# Patient Record
Sex: MALE | Race: OTHER | URBAN - METROPOLITAN AREA
[De-identification: names, ages, dates, MRNs, and addresses within clinical notes are randomized per-mention and may not be internally consistent; named-entity substitution may affect disease eponyms.]

---

## 2018-06-02 ENCOUNTER — EMERGENCY (EMERGENCY)
Facility: HOSPITAL | Age: 33
LOS: 0 days | Discharge: HOME | End: 2018-06-02
Attending: EMERGENCY MEDICINE | Admitting: EMERGENCY MEDICINE
Payer: COMMERCIAL

## 2018-06-02 VITALS
HEIGHT: 67 IN | OXYGEN SATURATION: 98 % | WEIGHT: 190.04 LBS | HEART RATE: 87 BPM | TEMPERATURE: 98 F | SYSTOLIC BLOOD PRESSURE: 129 MMHG | DIASTOLIC BLOOD PRESSURE: 73 MMHG | RESPIRATION RATE: 19 BRPM

## 2018-06-02 VITALS
OXYGEN SATURATION: 100 % | RESPIRATION RATE: 18 BRPM | TEMPERATURE: 98 F | DIASTOLIC BLOOD PRESSURE: 92 MMHG | SYSTOLIC BLOOD PRESSURE: 119 MMHG | HEART RATE: 80 BPM

## 2018-06-02 DIAGNOSIS — R00.2 PALPITATIONS: ICD-10-CM

## 2018-06-02 LAB
ALBUMIN SERPL ELPH-MCNC: 4.1 G/DL — SIGNIFICANT CHANGE UP (ref 3.3–5)
ALP SERPL-CCNC: 69 U/L — SIGNIFICANT CHANGE UP (ref 40–120)
ALT FLD-CCNC: 42 U/L — SIGNIFICANT CHANGE UP (ref 12–78)
ANION GAP SERPL CALC-SCNC: 5 MMOL/L — SIGNIFICANT CHANGE UP (ref 5–17)
AST SERPL-CCNC: 28 U/L — SIGNIFICANT CHANGE UP (ref 15–37)
BASOPHILS # BLD AUTO: 0.06 K/UL — SIGNIFICANT CHANGE UP (ref 0–0.2)
BASOPHILS NFR BLD AUTO: 0.9 % — SIGNIFICANT CHANGE UP (ref 0–2)
BILIRUB SERPL-MCNC: 0.4 MG/DL — SIGNIFICANT CHANGE UP (ref 0.2–1.2)
BUN SERPL-MCNC: 24 MG/DL — HIGH (ref 7–23)
CALCIUM SERPL-MCNC: 9.2 MG/DL — SIGNIFICANT CHANGE UP (ref 8.5–10.1)
CHLORIDE SERPL-SCNC: 105 MMOL/L — SIGNIFICANT CHANGE UP (ref 96–108)
CO2 SERPL-SCNC: 29 MMOL/L — SIGNIFICANT CHANGE UP (ref 22–31)
CREAT SERPL-MCNC: 0.94 MG/DL — SIGNIFICANT CHANGE UP (ref 0.5–1.3)
EOSINOPHIL # BLD AUTO: 0.16 K/UL — SIGNIFICANT CHANGE UP (ref 0–0.5)
EOSINOPHIL NFR BLD AUTO: 2.3 % — SIGNIFICANT CHANGE UP (ref 0–6)
GLUCOSE SERPL-MCNC: 114 MG/DL — HIGH (ref 70–99)
HCT VFR BLD CALC: 40.4 % — SIGNIFICANT CHANGE UP (ref 39–50)
HGB BLD-MCNC: 13.7 G/DL — SIGNIFICANT CHANGE UP (ref 13–17)
IMM GRANULOCYTES NFR BLD AUTO: 0.1 % — SIGNIFICANT CHANGE UP (ref 0–1.5)
LYMPHOCYTES # BLD AUTO: 2.31 K/UL — SIGNIFICANT CHANGE UP (ref 1–3.3)
LYMPHOCYTES # BLD AUTO: 33.3 % — SIGNIFICANT CHANGE UP (ref 13–44)
MAGNESIUM SERPL-MCNC: 1.9 MG/DL — SIGNIFICANT CHANGE UP (ref 1.6–2.6)
MCHC RBC-ENTMCNC: 30.6 PG — SIGNIFICANT CHANGE UP (ref 27–34)
MCHC RBC-ENTMCNC: 33.9 GM/DL — SIGNIFICANT CHANGE UP (ref 32–36)
MCV RBC AUTO: 90.4 FL — SIGNIFICANT CHANGE UP (ref 80–100)
MONOCYTES # BLD AUTO: 0.7 K/UL — SIGNIFICANT CHANGE UP (ref 0–0.9)
MONOCYTES NFR BLD AUTO: 10.1 % — SIGNIFICANT CHANGE UP (ref 2–14)
NEUTROPHILS # BLD AUTO: 3.7 K/UL — SIGNIFICANT CHANGE UP (ref 1.8–7.4)
NEUTROPHILS NFR BLD AUTO: 53.3 % — SIGNIFICANT CHANGE UP (ref 43–77)
NRBC # BLD: 0 /100 WBCS — SIGNIFICANT CHANGE UP (ref 0–0)
PHOSPHATE SERPL-MCNC: 3.3 MG/DL — SIGNIFICANT CHANGE UP (ref 2.5–4.5)
PLATELET # BLD AUTO: 219 K/UL — SIGNIFICANT CHANGE UP (ref 150–400)
POTASSIUM SERPL-MCNC: 3.7 MMOL/L — SIGNIFICANT CHANGE UP (ref 3.5–5.3)
POTASSIUM SERPL-SCNC: 3.7 MMOL/L — SIGNIFICANT CHANGE UP (ref 3.5–5.3)
PROT SERPL-MCNC: 8.1 GM/DL — SIGNIFICANT CHANGE UP (ref 6–8.3)
RBC # BLD: 4.47 M/UL — SIGNIFICANT CHANGE UP (ref 4.2–5.8)
RBC # FLD: 12 % — SIGNIFICANT CHANGE UP (ref 10.3–14.5)
SODIUM SERPL-SCNC: 139 MMOL/L — SIGNIFICANT CHANGE UP (ref 135–145)
WBC # BLD: 6.94 K/UL — SIGNIFICANT CHANGE UP (ref 3.8–10.5)
WBC # FLD AUTO: 6.94 K/UL — SIGNIFICANT CHANGE UP (ref 3.8–10.5)

## 2018-06-02 PROCEDURE — 99284 EMERGENCY DEPT VISIT MOD MDM: CPT | Mod: 25

## 2018-06-02 PROCEDURE — 93010 ELECTROCARDIOGRAM REPORT: CPT

## 2018-06-02 NOTE — ED ADULT NURSE NOTE - OBJECTIVE STATEMENT
pt presents to ED c/o sob, palpitations that woke him up. pt denies cp, sob, palpitations at this time. no med hx.

## 2018-06-02 NOTE — ED PROVIDER NOTE - MEDICAL DECISION MAKING DETAILS
Labs and EKG WNL.  Pt asymptomatic now.  Question possible panic attack.  Okay for d/c home.  F/u with PCP for further workup and management.

## 2018-06-02 NOTE — ED PROVIDER NOTE - OBJECTIVE STATEMENT
33 yo M no significant PMHx presents with CC palpitations.  Woke up tonight with SOB, palpitations, bilateral hand tingling, anxiety.  All symptoms now gone.  Denies previous occurrence.  Also some post-nasal drip recently.  Denies med use.  No other concerns.  Has had some stressors.

## 2018-06-02 NOTE — ED ADULT TRIAGE NOTE - CHIEF COMPLAINT QUOTE
c/o difficulty breathing and palpitations which woke him up this morning, pt states he has had a chronic cough, pt states he has had near syncopal episode today with tingling sensation to fingers

## 2020-02-25 ENCOUNTER — TRANSCRIPTION ENCOUNTER (OUTPATIENT)
Age: 35
End: 2020-02-25

## 2020-03-17 ENCOUNTER — TRANSCRIPTION ENCOUNTER (OUTPATIENT)
Age: 35
End: 2020-03-17

## 2020-04-02 ENCOUNTER — TRANSCRIPTION ENCOUNTER (OUTPATIENT)
Age: 35
End: 2020-04-02

## 2020-05-06 ENCOUNTER — TRANSCRIPTION ENCOUNTER (OUTPATIENT)
Age: 35
End: 2020-05-06

## 2020-05-28 ENCOUNTER — APPOINTMENT (OUTPATIENT)
Dept: INTERNAL MEDICINE | Facility: CLINIC | Age: 35
End: 2020-05-28
Payer: COMMERCIAL

## 2020-05-28 VITALS
OXYGEN SATURATION: 97 % | RESPIRATION RATE: 18 BRPM | SYSTOLIC BLOOD PRESSURE: 124 MMHG | BODY MASS INDEX: 29.03 KG/M2 | HEIGHT: 67 IN | DIASTOLIC BLOOD PRESSURE: 86 MMHG | WEIGHT: 185 LBS | HEART RATE: 88 BPM | TEMPERATURE: 98.9 F

## 2020-05-28 DIAGNOSIS — U07.1 COVID-19: ICD-10-CM

## 2020-05-28 DIAGNOSIS — R05 COUGH: ICD-10-CM

## 2020-05-28 PROCEDURE — 99204 OFFICE O/P NEW MOD 45 MIN: CPT

## 2020-05-28 RX ORDER — ALBUTEROL SULFATE 90 UG/1
108 (90 BASE) INHALANT RESPIRATORY (INHALATION)
Refills: 0 | Status: ACTIVE | COMMUNITY
Start: 2020-05-28

## 2020-05-28 NOTE — REVIEW OF SYSTEMS
[Sore Throat] : sore throat [Chest Pain] : chest pain [Cough] : cough [Negative] : Heme/Lymph [Fever] : no fever [Wheezing] : no wheezing [Chills] : no chills

## 2020-05-28 NOTE — HISTORY OF PRESENT ILLNESS
[FreeTextEntry8] : First visit for this 34-year-old male who was diagnosed as being positive COVID on April 3.  Tells me he developed cough in December. He subseqently developed sore throat, aches, and occasional shortness of breath in March.  He was seen at urgent care on 2 occasions.  he was treated with azithromycin and a Medrol Dosepak in March and again azithromycin and a Medrol Dosepak which he finished about 2 weeks ago.  Continues to have cough and little sputum.  Mild sore throat.  He is not having fever or chills.  He is comfortable at rest but senses an infrequent shortness of breath and an infrequent chest discomfort.  He has no history of asthma or COPD. he smoked for only 1 year at age 18. Not since.\par \par Tested positive for COVID IgG antibody on May 7.\par \par His CXR on 3/13/20 showed prominent markings.

## 2020-05-28 NOTE — PHYSICAL EXAM
[Well Developed] : well developed [No Acute Distress] : no acute distress [Well Nourished] : well nourished [Well-Appearing] : well-appearing [Normal Sclera/Conjunctiva] : normal sclera/conjunctiva [Normal Outer Ear/Nose] : the outer ears and nose were normal in appearance [No JVD] : no jugular venous distention [No Lymphadenopathy] : no lymphadenopathy [Supple] : supple [No Accessory Muscle Use] : no accessory muscle use [No Respiratory Distress] : no respiratory distress  [Clear to Auscultation] : lungs were clear to auscultation bilaterally [Normal Rate] : normal rate  [Normal S1, S2] : normal S1 and S2 [Regular Rhythm] : with a regular rhythm [No Edema] : there was no peripheral edema [Soft] : abdomen soft [No Extremity Clubbing/Cyanosis] : no extremity clubbing/cyanosis [Non-distended] : non-distended [Non Tender] : non-tender [No HSM] : no HSM [Normal Bowel Sounds] : normal bowel sounds [Normal Anterior Cervical Nodes] : no anterior cervical lymphadenopathy [No Rash] : no rash [No Focal Deficits] : no focal deficits [Normal Gait] : normal gait [Normal Affect] : the affect was normal [Normal Insight/Judgement] : insight and judgment were intact

## 2020-05-28 NOTE — ASSESSMENT
[FreeTextEntry1] : #1.  Recent COVID 19 infection. He tested positive on April 3. Also, postive for COVID IgG antibody on 5/7. Patient has some residual cough.  He will ensure fluids, take Robitussin syrup on a as needed basis.  Has taken 2 courses of azithro and medrol dose paulie.  Will try Albuterol inhaler 1 to 2 puffs 4 times daily as needed. For telephone following in 1 week.  Recommend continue to self isolate.  Call or return at anytime if any increase in symptoms or clinical change.

## 2020-09-23 NOTE — ED PROVIDER NOTE - GASTROINTESTINAL, MLM
Continue follow-up with Neurology  Continue Excedrin migraine for mild headaches and Maxalt for moderate to severe headaches  No longer on Paxil for preventative therapy  Abdomen soft, non-tender, no guarding.

## 2021-01-01 NOTE — ED PROVIDER NOTE - NS ED MD DISPO DISCHARGE
Home
Normal contour; nontender; liver palpable < 2 cm below rib margin, with sharp edge; adequate bowel sound pattern for age; no bruits; spleen tip absent or slightly below rib margin; kidney size and shape, if palpable is acceptable; abdominal distention and masses absent; abdominal wall defects absent; scaphoid abdomen absent; umbilicus with 3 vessels, normal color size, and texture.

## 2021-01-21 DIAGNOSIS — Z20.828 CONTACT WITH AND (SUSPECTED) EXPOSURE TO OTHER VIRAL COMMUNICABLE DISEASES: ICD-10-CM

## 2021-10-20 ENCOUNTER — TRANSCRIPTION ENCOUNTER (OUTPATIENT)
Age: 36
End: 2021-10-20

## 2022-01-04 ENCOUNTER — APPOINTMENT (OUTPATIENT)
Dept: OBGYN | Facility: CLINIC | Age: 37
End: 2022-01-04

## 2022-01-05 ENCOUNTER — TRANSCRIPTION ENCOUNTER (OUTPATIENT)
Age: 37
End: 2022-01-05

## 2022-01-07 LAB — SARS-COV-2 N GENE NPH QL NAA+PROBE: NOT DETECTED

## 2022-01-31 ENCOUNTER — TRANSCRIPTION ENCOUNTER (OUTPATIENT)
Age: 37
End: 2022-01-31

## 2022-02-03 ENCOUNTER — APPOINTMENT (OUTPATIENT)
Dept: GASTROENTEROLOGY | Facility: CLINIC | Age: 37
End: 2022-02-03
Payer: COMMERCIAL

## 2022-02-03 VITALS
BODY MASS INDEX: 29.51 KG/M2 | HEIGHT: 67 IN | HEART RATE: 63 BPM | DIASTOLIC BLOOD PRESSURE: 77 MMHG | SYSTOLIC BLOOD PRESSURE: 122 MMHG | WEIGHT: 188 LBS

## 2022-02-03 DIAGNOSIS — Z12.11 ENCOUNTER FOR SCREENING FOR MALIGNANT NEOPLASM OF COLON: ICD-10-CM

## 2022-02-03 DIAGNOSIS — R10.12 LEFT UPPER QUADRANT PAIN: ICD-10-CM

## 2022-02-03 PROCEDURE — 99202 OFFICE O/P NEW SF 15 MIN: CPT

## 2022-02-03 NOTE — HISTORY OF PRESENT ILLNESS
[de-identified] : 37yo male with LUQ pain\par \par Pain is LUQ and superficial at times. Heating pad will help.\par Worse with position when turning and when lifting up his infant.\par No change with diet, no associated gi symptoms, normal BM

## 2022-02-03 NOTE — ASSESSMENT
[FreeTextEntry1] : 37yo male with LUQ pain\par \par Likely MS pain\par \par heating pad, NSAIDs\par \par refer to primary care doctor for further mgmt of likely upper abdominal pain

## 2023-04-18 ENCOUNTER — NON-APPOINTMENT (OUTPATIENT)
Age: 38
End: 2023-04-18

## 2024-05-06 ENCOUNTER — EMERGENCY (EMERGENCY)
Facility: HOSPITAL | Age: 39
LOS: 0 days | Discharge: HOME | End: 2024-05-06
Attending: STUDENT IN AN ORGANIZED HEALTH CARE EDUCATION/TRAINING PROGRAM
Payer: COMMERCIAL

## 2024-05-06 VITALS
DIASTOLIC BLOOD PRESSURE: 94 MMHG | RESPIRATION RATE: 16 BRPM | HEART RATE: 100 BPM | TEMPERATURE: 99 F | OXYGEN SATURATION: 98 % | SYSTOLIC BLOOD PRESSURE: 138 MMHG

## 2024-05-06 VITALS
RESPIRATION RATE: 16 BRPM | HEART RATE: 98 BPM | TEMPERATURE: 99 F | DIASTOLIC BLOOD PRESSURE: 93 MMHG | SYSTOLIC BLOOD PRESSURE: 131 MMHG | OXYGEN SATURATION: 98 %

## 2024-05-06 DIAGNOSIS — L73.2 HIDRADENITIS SUPPURATIVA: ICD-10-CM

## 2024-05-06 LAB
ANION GAP SERPL CALC-SCNC: 4 MMOL/L — LOW (ref 5–17)
BASOPHILS # BLD AUTO: 0.07 K/UL — SIGNIFICANT CHANGE UP (ref 0–0.2)
BASOPHILS NFR BLD AUTO: 0.6 % — SIGNIFICANT CHANGE UP (ref 0–2)
BUN SERPL-MCNC: 19 MG/DL — SIGNIFICANT CHANGE UP (ref 7–23)
CALCIUM SERPL-MCNC: 9.8 MG/DL — SIGNIFICANT CHANGE UP (ref 8.5–10.1)
CHLORIDE SERPL-SCNC: 110 MMOL/L — HIGH (ref 96–108)
CO2 SERPL-SCNC: 27 MMOL/L — SIGNIFICANT CHANGE UP (ref 22–31)
CREAT SERPL-MCNC: 1 MG/DL — SIGNIFICANT CHANGE UP (ref 0.5–1.3)
EGFR: 99 ML/MIN/1.73M2 — SIGNIFICANT CHANGE UP
EOSINOPHIL # BLD AUTO: 0.23 K/UL — SIGNIFICANT CHANGE UP (ref 0–0.5)
EOSINOPHIL NFR BLD AUTO: 2 % — SIGNIFICANT CHANGE UP (ref 0–6)
GLUCOSE SERPL-MCNC: 107 MG/DL — HIGH (ref 70–99)
HCT VFR BLD CALC: 42 % — SIGNIFICANT CHANGE UP (ref 39–50)
HGB BLD-MCNC: 13.9 G/DL — SIGNIFICANT CHANGE UP (ref 13–17)
IMM GRANULOCYTES NFR BLD AUTO: 0.2 % — SIGNIFICANT CHANGE UP (ref 0–0.9)
LYMPHOCYTES # BLD AUTO: 2.84 K/UL — SIGNIFICANT CHANGE UP (ref 1–3.3)
LYMPHOCYTES # BLD AUTO: 25 % — SIGNIFICANT CHANGE UP (ref 13–44)
MCHC RBC-ENTMCNC: 30.1 PG — SIGNIFICANT CHANGE UP (ref 27–34)
MCHC RBC-ENTMCNC: 33.1 GM/DL — SIGNIFICANT CHANGE UP (ref 32–36)
MCV RBC AUTO: 90.9 FL — SIGNIFICANT CHANGE UP (ref 80–100)
MONOCYTES # BLD AUTO: 1.08 K/UL — HIGH (ref 0–0.9)
MONOCYTES NFR BLD AUTO: 9.5 % — SIGNIFICANT CHANGE UP (ref 2–14)
NEUTROPHILS # BLD AUTO: 7.1 K/UL — SIGNIFICANT CHANGE UP (ref 1.8–7.4)
NEUTROPHILS NFR BLD AUTO: 62.7 % — SIGNIFICANT CHANGE UP (ref 43–77)
PLATELET # BLD AUTO: 250 K/UL — SIGNIFICANT CHANGE UP (ref 150–400)
POTASSIUM SERPL-MCNC: 3.7 MMOL/L — SIGNIFICANT CHANGE UP (ref 3.5–5.3)
POTASSIUM SERPL-SCNC: 3.7 MMOL/L — SIGNIFICANT CHANGE UP (ref 3.5–5.3)
RBC # BLD: 4.62 M/UL — SIGNIFICANT CHANGE UP (ref 4.2–5.8)
RBC # FLD: 12.3 % — SIGNIFICANT CHANGE UP (ref 10.3–14.5)
SODIUM SERPL-SCNC: 141 MMOL/L — SIGNIFICANT CHANGE UP (ref 135–145)
WBC # BLD: 11.34 K/UL — HIGH (ref 3.8–10.5)
WBC # FLD AUTO: 11.34 K/UL — HIGH (ref 3.8–10.5)

## 2024-05-06 PROCEDURE — 10060 I&D ABSCESS SIMPLE/SINGLE: CPT

## 2024-05-06 PROCEDURE — 80048 BASIC METABOLIC PNL TOTAL CA: CPT

## 2024-05-06 PROCEDURE — 99285 EMERGENCY DEPT VISIT HI MDM: CPT

## 2024-05-06 PROCEDURE — 85025 COMPLETE CBC W/AUTO DIFF WBC: CPT

## 2024-05-06 PROCEDURE — 99284 EMERGENCY DEPT VISIT MOD MDM: CPT | Mod: 25

## 2024-05-06 PROCEDURE — 36415 COLL VENOUS BLD VENIPUNCTURE: CPT

## 2024-05-06 PROCEDURE — 76882 US LMTD JT/FCL EVL NVASC XTR: CPT | Mod: 26,RT

## 2024-05-06 PROCEDURE — 76882 US LMTD JT/FCL EVL NVASC XTR: CPT | Mod: RT

## 2024-05-06 RX ORDER — ACETAMINOPHEN 500 MG
650 TABLET ORAL ONCE
Refills: 0 | Status: COMPLETED | OUTPATIENT
Start: 2024-05-06 | End: 2024-05-06

## 2024-05-06 RX ORDER — CEPHALEXIN 500 MG
1 CAPSULE ORAL
Qty: 40 | Refills: 0
Start: 2024-05-06 | End: 2024-05-15

## 2024-05-06 RX ADMIN — Medication 1 TABLET(S): at 21:35

## 2024-05-06 RX ADMIN — Medication 650 MILLIGRAM(S): at 21:35

## 2024-05-06 NOTE — CONSULT NOTE ADULT - ASSESSMENT
39 yo male with RT axillary infected hydradenitis  -Will bedside I/D  -PO Keflex  -Ibuprofen  -Daily shower  -Dry dressing  -Might need excision of hydradenitis regardless of I/D

## 2024-05-06 NOTE — ED STATDOCS - ATTENDING APP SHARED VISIT CONTRIBUTION OF CARE
I, Michelle Santo MD,  I personally saw the pateint and performed a substantive portion of the visit including the following: initial face to face bedside interview with this patient regarding history of present illness, review of symptoms and relevant past medical, social and family history, independent physical examination, and medical decision making. I was the initial provider who evaluated this patient. I have discussed I have signed out the follow up of any pending tests (i.e. labs, radiological studies) to the PA/NP. I have communicated the patient’s plan of care and disposition with the PA/NP.  The history, relevant review of systems, past medical and surgical history, medical decision making, and physical examination was documented by the scribe in my presence and I attest to the accuracy of the documentation.

## 2024-05-06 NOTE — ED ADULT NURSE NOTE - NS ED NURSE DC INFO COMPLEXITY
Simple: Patient demonstrates quick and easy understanding H Plasty Text: Given the location of the defect, shape of the defect and the proximity to free margins a H-plasty was deemed most appropriate for repair.  Using a sterile surgical marker, the appropriate advancement arms of the H-plasty were drawn incorporating the defect and placing the expected incisions within the relaxed skin tension lines where possible. The area thus outlined was incised deep to adipose tissue with a #15 scalpel blade. The skin margins were undermined to an appropriate distance in all directions utilizing iris scissors.  The opposing advancement arms were then advanced into place in opposite direction and anchored with interrupted buried subcutaneous sutures.

## 2024-05-06 NOTE — ED STATDOCS - PHYSICAL EXAMINATION
Michelle Santo MD, Attending  Gen: Well appearing in NAD   Head: NC/AT  Neck: trachea midline  Resp:  No distress  Ext: no deformities  Neuro:  A&O appears non focal  Skin:  Warm and dry as visualized  Psych:  Normal affect and mood Michelle Santo MD, Attending  Gen: Well appearing in NAD   Head: NC/AT  Neck: trachea midline  Resp:  No distress  Ext: no deformities  Neuro:  A&O appears non focal  Skin:  Warm and dry as visualized, R axially area of fluctuance roughly 4cm in length by 2cm, mild overlying redness, minimal tenderness   Psych:  Normal affect and mood Michelle Santo MD, Attending  Gen: Well appearing in NAD   Head: NC/AT  Neck: trachea midline  Resp:  No distress  Ext: no deformities  Neuro:  A&O appears non focal  Skin:  Warm and dry as visualized, R axially area of fluctuance roughly 4cm in length by 2cm, mild overlying redness, + tender to palpation  Psych:  Normal affect and mood

## 2024-05-06 NOTE — ED ADULT TRIAGE NOTE - CHIEF COMPLAINT QUOTE
Pt presents to er with complaints of right armpit pain, states he feels chills, had Ibuprofen prior to arrival, pt was on Cipro for a left sided armpit infection and finished his dose 3 days ago.

## 2024-05-06 NOTE — CONSULT NOTE ADULT - SUBJECTIVE AND OBJECTIVE BOX
39 yo male h/o left axillary hydradenitis surgery 6 months ago c/o RT axillary pain/swelling x last 2 months, took abx a week ago developing an abscess. Denies any fever. Pain 9/10, not relived by aleve.  WBC 11,000. RT axillary US showed abscess    "date of service"05-06-24 @ 22:59    HPI:      PAST MEDICAL & SURGICAL HISTORY:    left axillary hydradenitis surgery    05-06-24 @ 22:59  REVIEW OF SYSTEMS:    CONSTITUTIONAL: No weakness, fevers or chills  EYES/ENT: No visual changes;  No vertigo or throat pain   NECK: No pain or stiffness  RESPIRATORY: No cough, wheezing, hemoptysis; No shortness of breath  CARDIOVASCULAR: No chest pain or palpitations  GASTROINTESTINAL: No abdominal or epigastric pain. No nausea, vomiting, or hematemesis; No diarrhea or constipation. No melena or hematochezia.  GENITOURINARY: No dysuria, frequency or hematuria  NEUROLOGICAL: No numbness or weakness  SKIN: No itching, burning, rashes, or lesions   All other review of systems is negative unless indicated above.    MEDICATIONS  (STANDING):    MEDICATIONS  (PRN):      Allergies    No Known Allergies    Intolerances        SOCIAL HISTORY: non smoker    FAMILY HISTORY:non contributory      Vital Signs Last 24 Hrs  T(C): 37.2 (06 May 2024 19:39), Max: 37.2 (06 May 2024 19:39)  T(F): 99 (06 May 2024 19:39), Max: 99 (06 May 2024 19:39)  HR: 100 (06 May 2024 19:39) (100 - 100)  BP: 138/94 (06 May 2024 19:39) (138/94 - 138/94)  BP(mean): 108 (06 May 2024 19:39) (108 - 108)  RR: 16 (06 May 2024 19:39) (16 - 16)  SpO2: 98% (06 May 2024 19:39) (98% - 98%)    Parameters below as of 06 May 2024 19:39  Patient On (Oxygen Delivery Method): room air        .05-06-24 @ 22:59  VITAL SIGNS:  T(C): 37.2 (05-06-24 @ 19:39), Max: 37.2 (05-06-24 @ 19:39)  T(F): 99 (05-06-24 @ 19:39), Max: 99 (05-06-24 @ 19:39)  HR: 100 (05-06-24 @ 19:39) (100 - 100)  BP: 138/94 (05-06-24 @ 19:39) (138/94 - 138/94)  BP(mean): 108 (05-06-24 @ 19:39) (108 - 108)  RR: 16 (05-06-24 @ 19:39) (16 - 16)  SpO2: 98% (05-06-24 @ 19:39) (98% - 98%)  Wt(kg): --    PHYSICAL EXAM:    Constitutional: resting comfortably in bed; NAD  Eyes: PERRL, EOMI, anicteric sclera  ENT: no nasal discharge; uvula midline, no oropharyngeal erythema or exudates  Neck: supple; no JVD or thyromegaly  Respiratory: CTA B/L; no W/R/R, no retractions  RT axillary 4xm x 5 cm abscess, fluctuance. RT axillary hydradenitis, hard, indurated  Cardiac: +S1/S2; RRR; no murmurs  Gastrointestinal: soft, NT/ND; no rebound or guarding; +BSx4  Back: spine midline, no bony tenderness or step-offs; no CVAT B/L  Extremities: no clubbing or cyanosis; no peripheral edema  Musculoskeletal: NROM x4; no joint swelling, tenderness or erythema  Vascular: 2+ radial, femoral, DP/PT pulses B/L  Dermatologic: skin warm, dry and intact; no rashes, wounds, or scars  Lymphatic: no submandibular or cervical LAD  Neurologic: AAOx3; CNII-XII grossly intact; no focal deficits  Psychiatric: affect and characteristics of appearance, verbalizations, behaviors are appropriate    LABS:                        13.9   11.34 )-----------( 250      ( 06 May 2024 20:52 )             42.0       05-06    141  |  110<H>  |  19  ----------------------------<  107<H>  3.7   |  27  |  1.00    Ca    9.8      06 May 2024 20:52            Urinalysis Basic - ( 06 May 2024 20:52 )    Color: x / Appearance: x / SG: x / pH: x  Gluc: 107 mg/dL / Ketone: x  / Bili: x / Urobili: x   Blood: x / Protein: x / Nitrite: x   Leuk Esterase: x / RBC: x / WBC x   Sq Epi: x / Non Sq Epi: x / Bacteria: x        RADIOLOGY & ADDITIONAL STUDIES:

## 2024-05-06 NOTE — ED ADULT NURSE NOTE - OBJECTIVE STATEMENT
The pt is a 39 y/o male A&OX4 ambulatory presenting to the ED c/o R armpit pain. Pt reports that in september 2023 he had the same issue in his L armpit and he had a procedure to remove it and it is now happening to the R armpit. Pt states "I was using an antipersperant and forgot and kept using it and that is why my R armpit is swollen". Pt reports fever, chills but took ibuprofen at home PTA. Pt denies SOB, CP, N/V/D. Respirations even and unlabored, no distress noted.

## 2024-05-06 NOTE — ED STATDOCS - CLINICAL SUMMARY MEDICAL DECISION MAKING FREE TEXT BOX
Michelle Santo MD, Attending  ddx includes, but is not limited to the following:  plan:  update: see progress notes.   ---- Michelle Santo MD, Attending  ddx includes, but is not limited to the following: axillary abscess, hydradenitises, lower suspicion for cellulitis  plan: screening labs ultrasound surgery consult for I and D and f/u  update: see progress notes.   ---- Michelle Santo MD, Attending  ddx includes, but is not limited to the following: axillary abscess, hydradenitises, lower suspicion for cellulitis  plan: screening labs ultrasound surgery consult for I and D and f/u outpt with surgery.  update: see progress notes.   ----

## 2024-05-06 NOTE — ED STATDOCS - NSFOLLOWUPINSTRUCTIONS_ED_ALL_ED_FT
Follow up with Dr. Griggs in 2 weeks  Please use 650mg tylenol (or acetaminophen) every 6 hours and 600mg motrin (or advil or ibuprofen) every 6 hours as needed for pain/discomfort/swelling.  Make sure not to use more than 3500mg in any 24 hour period.   Take antibiotics as prescribed  Any worsening of symptoms or new concerning symptoms return to the ED

## 2024-05-06 NOTE — ED STATDOCS - PATIENT PORTAL LINK FT
You can access the FollowMyHealth Patient Portal offered by [facility] by registering at the following website: [email or web address]. By joining [facility]’s FollowMyHealth portal, you will also be able to view your health information using other applications (apps) compatible with our system.

## 2024-05-06 NOTE — ED ADULT NURSE NOTE - NSFALLUNIVINTERV_ED_ALL_ED
Bed/Stretcher in lowest position, wheels locked, appropriate side rails in place/Call bell, personal items and telephone in reach/Instruct patient to call for assistance before getting out of bed/chair/stretcher/Non-slip footwear applied when patient is off stretcher/[place] to call system/Physically safe environment - no spills, clutter or unnecessary equipment/Purposeful proactive rounding/Room/bathroom lighting operational, light cord in reach

## 2024-05-06 NOTE — ED STATDOCS - OBJECTIVE STATEMENT
39 yo male w/ no pertinent PMHx presents to the ED c/o R armpit pain. Pt in September had an infection under his L arm, had a procedure done, and is now developing similar pain around an infection under his R armpit. Pt states the area is hot to touch, painful, and that he felt feverish and his joints began to hurt, so he took Ibuprofen PTA. Pt was on Cipro for L sided armpit infection and finished his course 3 days ago. Pt states he has been using an antiperspirant which caused the infection under his L arm, and then he used the same product under his R arm, and states he is now having the same issues with his R armpit. 39 yo male w/ no pertinent PMHx presents to the ED c/o R axilla collection. Pt has hx of hydradenitis, sp excision in L axilla ~6 months ago.  Pt finished course of cipro for R axilla collection 3 days ago. States pain in getting worse and collection getting bigger. Has been using warm compresses. Pain mildly relieved with ibuprofen prior to ED arrival.    Pt has been trying to get in touch with surgeon who operated on his L axilla, has not been successful.

## 2024-05-06 NOTE — ED STATDOCS - CARE PROVIDER_API CALL
Temo Healy.  Surgery  [address] [place] [id number]  Phone: [phone]  Fax: [phone]  Follow Up Time:

## 2024-05-06 NOTE — ED STATDOCS - PROGRESS NOTE DETAILS
Vikki: Dr. Griggs came and drained abscess with JANETT reynoso in 2 weeks in the office. keflex. OTC pain meds, return if worsening

## 2025-06-22 ENCOUNTER — NON-APPOINTMENT (OUTPATIENT)
Age: 40
End: 2025-06-22

## 2025-09-13 ENCOUNTER — NON-APPOINTMENT (OUTPATIENT)
Age: 40
End: 2025-09-13